# Patient Record
Sex: FEMALE | Race: WHITE | Employment: OTHER | URBAN - METROPOLITAN AREA
[De-identification: names, ages, dates, MRNs, and addresses within clinical notes are randomized per-mention and may not be internally consistent; named-entity substitution may affect disease eponyms.]

---

## 2017-07-19 ENCOUNTER — HOSPITAL ENCOUNTER (OUTPATIENT)
Dept: MAMMOGRAPHY | Age: 77
Discharge: HOME | End: 2017-07-19
Attending: FAMILY MEDICINE
Payer: MEDICARE

## 2017-07-19 DIAGNOSIS — Z12.31 VISIT FOR SCREENING MAMMOGRAM: ICD-10-CM

## 2017-07-19 PROCEDURE — 77067 SCR MAMMO BI INCL CAD: CPT

## 2020-08-09 ENCOUNTER — APPOINTMENT (OUTPATIENT)
Dept: CT IMAGING | Age: 80
End: 2020-08-09
Attending: EMERGENCY MEDICINE
Payer: MEDICARE

## 2020-08-09 ENCOUNTER — APPOINTMENT (OUTPATIENT)
Dept: GENERAL RADIOLOGY | Age: 80
End: 2020-08-09
Attending: EMERGENCY MEDICINE
Payer: MEDICARE

## 2020-08-09 ENCOUNTER — HOSPITAL ENCOUNTER (EMERGENCY)
Age: 80
Discharge: HOME | End: 2020-08-09
Attending: EMERGENCY MEDICINE
Payer: MEDICARE

## 2020-08-09 VITALS
SYSTOLIC BLOOD PRESSURE: 101 MMHG | HEART RATE: 68 BPM | BODY MASS INDEX: 26.31 KG/M2 | DIASTOLIC BLOOD PRESSURE: 60 MMHG | RESPIRATION RATE: 16 BRPM | OXYGEN SATURATION: 99 % | HEIGHT: 61 IN | TEMPERATURE: 98.7 F | WEIGHT: 139.33 LBS

## 2020-08-09 DIAGNOSIS — S00.83XA TRAUMATIC HEMATOMA OF FOREHEAD, INITIAL ENCOUNTER: ICD-10-CM

## 2020-08-09 DIAGNOSIS — S09.90XA MINOR HEAD INJURY, INITIAL ENCOUNTER: Primary | ICD-10-CM
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
## 2020-08-09 PROCEDURE — 72125 CT NECK SPINE W/O DYE: CPT

## 2020-08-09 PROCEDURE — 73552 X-RAY EXAM OF FEMUR 2/>: CPT

## 2020-08-09 PROCEDURE — 99283 EMERGENCY DEPT VISIT LOW MDM: CPT

## 2020-08-09 PROCEDURE — 70450 CT HEAD/BRAIN W/O DYE: CPT

## 2020-08-09 PROCEDURE — 73502 X-RAY EXAM HIP UNI 2-3 VIEWS: CPT

## 2020-08-09 NOTE — ED TRIAGE NOTES
Triage Note: Patient arrives to ER complaining of fall, patient states she slipped on wet juvencio and fell hitting her head. Bruising noted tot he left side of her forehead. Denies LOC. Takes a baby aspirin but not blood thinners. Also complaining of pain to the back of her upper left leg. Patient was ambulatory to treatment area.

## 2020-08-09 NOTE — DISCHARGE INSTRUCTIONS
Patient Education        Head Injury: Care Instructions  Your Care Instructions     Most injuries to the head are minor. Bumps, cuts, and scrapes on the head and face usually heal well and can be treated the same as injuries to other parts of the body. Although it's rare, once in a while a more serious problem shows up after you are home. So it's good to be on the lookout for symptoms for a day or two. Follow-up care is a key part of your treatment and safety. Be sure to make and go to all appointments, and call your doctor if you are having problems. It's also a good idea to know your test results and keep a list of the medicines you take. How can you care for yourself at home? · Follow your doctor's instructions. He or she will tell you if you need someone to watch you closely for the next 24 hours or longer. · Take it easy for the next few days or more if you are not feeling well. · Ask your doctor when it's okay for you to go back to activities like driving a car, riding a bike, or operating machinery. When should you call for help? [id number] anytime you think you may need emergency care. For example, call if:  · You have a seizure. · You passed out (lost consciousness). · You are confused or can't stay awake. Call your doctor now or seek immediate medical care if:  · You have new or worse vomiting. · You feel less alert. · You have new weakness or numbness in any part of your body. Watch closely for changes in your health, and be sure to contact your doctor if:  · You do not get better as expected. · You have new symptoms, such as headaches, trouble concentrating, or changes in mood. Where can you learn more? Go to http://aylin-amarilis.info/  Enter M264 in the search box to learn more about \"Head Injury: Care Instructions. \"  Current as of: November 20, 2019               Content Version: 12.5  © 7858-0916 Healthwise, Incorporated.    Care instructions adapted under license by [employer] (which disclaims liability or warranty for this information). If you have questions about a medical condition or this instruction, always ask your healthcare professional. [address] any warranty or liability for your use of this information.

## 2020-08-09 NOTE — ED PROVIDER NOTES
HPI       [de-identified] F here s/p fall. Occurred a few hours prior to arrival. Slipped on a wet floor and hit her L forehead. Landed on hardwood juvencio. No LOC. No vomiting. Overall feels ok. Takes ASA but no other blood thinners. Has also had some mild pain in the L upper hamstring since the fall but mostly only when flexing the leg. Is able to ambulate without discomfort. No distal weakness/numbness. No neck pain. No focal weakness/numbness. No chest pain. No trouble breathing. Past Medical History:   Diagnosis Date    Arthritis     GERD (gastroesophageal reflux disease)        Past Surgical History:   Procedure Laterality Date    HX BREAST BIOPSY Right     benign surgical biopsy    HX  SECTION      HX CHOLECYSTECTOMY      HX CYST INCISION AND DRAINAGE Left     HX DILATION AND CURETTAGE      HX ORTHOPAEDIC      left knee replacement    HX ORTHOPAEDIC      right rotator cuff repair    [place] COLONOSCOPY FLX DX W/COLLJ SPEC WHEN PFRMD  2012              No family history on file.     Social History     Socioeconomic History    Marital status:      Spouse name: Not on file    Number of children: Not on file    Years of education: Not on file    Highest education level: Not on file   Occupational History    Not on file   Social Needs    Financial resource strain: Not on file    Food insecurity     Worry: Not on file     Inability: Not on file    Transportation needs     Medical: Not on file     Non-medical: Not on file   Tobacco Use    Smoking status: Never Smoker   Substance and Sexual Activity    Alcohol use: No    Drug use: No    Sexual activity: Not on file   Lifestyle    Physical activity     Days per week: Not on file     Minutes per session: Not on file    Stress: Not on file   Relationships    Social connections     Talks on phone: Not on file     Gets together: Not on file     Attends Mormonism service: Not on file     Active member of club or organization: Not on file     Attends meetings of clubs or organizations: Not on file     Relationship status: Not on file    Intimate partner violence     Fear of current or ex partner: Not on file     Emotionally abused: Not on file     Physically abused: Not on file     Forced sexual activity: Not on file   Other Topics Concern    Not on file   Social History Narrative    Not on file         ALLERGIES: Beef containing products and Pork derived (porcine)    Review of Systems   Review of Systems   Constitutional: (-) weight loss. HEENT: (-) stiff neck   Eyes: (-) discharge. Respiratory: (-) cough. Cardiovascular: (-) syncope. Gastrointestinal: (-) blood in stool. Genitourinary: (-) hematuria. Musculoskeletal: (-) myalgias. Neurological: (-) seizure. Skin: (-) petechiae  Lymph/Immunologic: (-) enlarged lymph nodes  All other systems reviewed and are negative. There were no vitals filed for this visit. Physical Exam Nursing note and vitals reviewed. Constitutional: oriented to person, place, and time. appears well-developed and well-nourished. No distress. Head: Normocephalic and ecchymosis to the L forehead. No stepoffs/bogginess. Sclera anicteric  Nose: No rhinorrhea  EARS: no hemotympanum. Normal landmarks. Mouth/Throat: Oropharynx is clear and moist. Pharynx normal  Eyes: Conjunctivae are normal. Pupils are equal, round, and reactive to light. Right eye exhibits no discharge. Left eye exhibits no discharge. Neck: Painless normal range of motion. Neck supple. No LAD. Cardiovascular: Normal rate, regular rhythm, normal heart sounds and intact distal pulses. Exam reveals no gallop and no friction rub. No murmur heard. Pulmonary/Chest:  No respiratory distress. No wheezes. No rales. No rhonchi. No increased work of breathing. No accessory muscle use. Good air exchange throughout. Abdominal: soft, non-tender, no rebound or guarding. No hepatosplenomegaly. Normal bowel sounds throughout.   Back: no tenderness to palpation, no deformities, no CVA tenderness  Extremities/Musculoskeletal: Normal range of motion. no tenderness. No edema. Distal extremities are neurovasc intact. Lymphadenopathy:   No adenopathy. Neurological:  Alert and oriented to person, place, and time. Coordination normal. CN 2-12 intact. Motor and sensory function intact. Skin: Skin is warm and dry. No rash noted. No pallor. MDM  [de-identified] F here with a fall and hit her head. Sounds mechanical in nature. Non-focal exam. Will get CT head/c-spine and xray hip/femur.          Procedures

## 2021-02-15 ENCOUNTER — HOSPITAL ENCOUNTER (OUTPATIENT)
Age: 81
Setting detail: OBSERVATION
Discharge: HOME | End: 2021-02-16
Attending: EMERGENCY MEDICINE | Admitting: FAMILY MEDICINE
Payer: MEDICARE

## 2021-02-15 ENCOUNTER — APPOINTMENT (OUTPATIENT)
Dept: MRI IMAGING | Age: 81
End: 2021-02-15
Attending: FAMILY MEDICINE
Payer: MEDICARE

## 2021-02-15 ENCOUNTER — APPOINTMENT (OUTPATIENT)
Dept: CT IMAGING | Age: 81
End: 2021-02-15
Attending: EMERGENCY MEDICINE
Payer: MEDICARE

## 2021-02-15 ENCOUNTER — APPOINTMENT (OUTPATIENT)
Dept: MRI IMAGING | Age: 81
End: 2021-02-15
Attending: PSYCHIATRY & NEUROLOGY
Payer: MEDICARE

## 2021-02-15 DIAGNOSIS — R20.2 NUMBNESS AND TINGLING: ICD-10-CM

## 2021-02-15 DIAGNOSIS — R20.0 NUMBNESS AND TINGLING: ICD-10-CM

## 2021-02-15 DIAGNOSIS — R00.2 PALPITATIONS: Primary | ICD-10-CM

## 2021-02-15 LAB
ALBUMIN SERPL-MCNC: 3.5 G/DL (ref 3.5–5)
ALBUMIN/GLOB SERPL: 1 {RATIO} (ref 1.1–2.2)
ALP SERPL-CCNC: 51 U/L (ref 45–117)
ALT SERPL-CCNC: 27 U/L (ref 12–78)
ANION GAP SERPL CALC-SCNC: 10 MMOL/L (ref 5–15)
ASPIRIN TEST, ASPIRN: 400 ARU
AST SERPL-CCNC: 28 U/L (ref 15–37)
ATRIAL RATE: 67 BPM
BASOPHILS # BLD: 0.1 K/UL (ref 0–0.1)
BASOPHILS NFR BLD: 1 % (ref 0–1)
BILIRUB SERPL-MCNC: 0.5 MG/DL (ref 0.2–1)
BUN SERPL-MCNC: 14 MG/DL (ref 6–20)
BUN/CREAT SERPL: 18 (ref 12–20)
CALCIUM SERPL-MCNC: 8.6 MG/DL (ref 8.5–10.1)
CALCULATED P AXIS, ECG09: 48 DEGREES
CALCULATED R AXIS, ECG10: 25 DEGREES
CALCULATED T AXIS, ECG11: 33 DEGREES
CHLORIDE SERPL-SCNC: 106 MMOL/L (ref 97–108)
CO2 SERPL-SCNC: 28 MMOL/L (ref 21–32)
COMMENT, HOLDF: NORMAL
CREAT SERPL-MCNC: 0.79 MG/DL (ref 0.55–1.02)
DIAGNOSIS, 93000: NORMAL
DIFFERENTIAL METHOD BLD: ABNORMAL
EOSINOPHIL # BLD: 0.1 K/UL (ref 0–0.4)
EOSINOPHIL NFR BLD: 1 % (ref 0–7)
ERYTHROCYTE [DISTWIDTH] IN BLOOD BY AUTOMATED COUNT: 13.2 % (ref 11.5–14.5)
ERYTHROCYTE [SEDIMENTATION RATE] IN BLOOD: 16 MM/HR (ref 0–30)
GLOBULIN SER CALC-MCNC: 3.5 G/DL (ref 2–4)
GLUCOSE SERPL-MCNC: 109 MG/DL (ref 65–100)
HCT VFR BLD AUTO: 39.6 % (ref 35–47)
HGB BLD-MCNC: 12.7 G/DL (ref 11.5–16)
IMM GRANULOCYTES # BLD AUTO: 0 K/UL (ref 0–0.04)
IMM GRANULOCYTES NFR BLD AUTO: 0 % (ref 0–0.5)
LYMPHOCYTES # BLD: 2.2 K/UL (ref 0.8–3.5)
LYMPHOCYTES NFR BLD: 25 % (ref 12–49)
MCH RBC QN AUTO: 32.6 PG (ref 26–34)
MCHC RBC AUTO-ENTMCNC: 32.1 G/DL (ref 30–36.5)
MCV RBC AUTO: 101.5 FL (ref 80–99)
MONOCYTES # BLD: 0.7 K/UL (ref 0–1)
MONOCYTES NFR BLD: 8 % (ref 5–13)
NEUTS SEG # BLD: 5.8 K/UL (ref 1.8–8)
NEUTS SEG NFR BLD: 65 % (ref 32–75)
NRBC # BLD: 0 K/UL (ref 0–0.01)
NRBC BLD-RTO: 0 PER 100 WBC
P-R INTERVAL, ECG05: 176 MS
PLATELET # BLD AUTO: 358 K/UL (ref 150–400)
PMV BLD AUTO: 8.9 FL (ref 8.9–12.9)
POTASSIUM SERPL-SCNC: 4.3 MMOL/L (ref 3.5–5.1)
PROT SERPL-MCNC: 7 G/DL (ref 6.4–8.2)
Q-T INTERVAL, ECG07: 412 MS
QRS DURATION, ECG06: 68 MS
QTC CALCULATION (BEZET), ECG08: 435 MS
RBC # BLD AUTO: 3.9 M/UL (ref 3.8–5.2)
SAMPLES BEING HELD,HOLD: NORMAL
SODIUM SERPL-SCNC: 144 MMOL/L (ref 136–145)
TROPONIN I SERPL-MCNC: <0.05 NG/ML
TROPONIN I SERPL-MCNC: <0.05 NG/ML
VENTRICULAR RATE, ECG03: 67 BPM
WBC # BLD AUTO: 8.9 K/UL (ref 3.6–11)

## 2021-02-15 PROCEDURE — 84484 ASSAY OF TROPONIN QUANT: CPT

## 2021-02-15 PROCEDURE — 70450 CT HEAD/BRAIN W/O DYE: CPT

## 2021-02-15 PROCEDURE — 99285 EMERGENCY DEPT VISIT HI MDM: CPT

## 2021-02-15 PROCEDURE — 94762 N-INVAS EAR/PLS OXIMTRY CONT: CPT

## 2021-02-15 PROCEDURE — 74011250636 HC RX REV CODE- 250/636: Performed by: FAMILY MEDICINE

## 2021-02-15 PROCEDURE — 72141 MRI NECK SPINE W/O DYE: CPT

## 2021-02-15 PROCEDURE — 94760 N-INVAS EAR/PLS OXIMETRY 1: CPT

## 2021-02-15 PROCEDURE — 85025 COMPLETE CBC W/AUTO DIFF WBC: CPT

## 2021-02-15 PROCEDURE — 70551 MRI BRAIN STEM W/O DYE: CPT

## 2021-02-15 PROCEDURE — 70498 CT ANGIOGRAPHY NECK: CPT

## 2021-02-15 PROCEDURE — 74011250637 HC RX REV CODE- 250/637: Performed by: FAMILY MEDICINE

## 2021-02-15 PROCEDURE — 99205 OFFICE O/P NEW HI 60 MIN: CPT | Performed by: PSYCHIATRY & NEUROLOGY

## 2021-02-15 PROCEDURE — 93005 ELECTROCARDIOGRAM TRACING: CPT

## 2021-02-15 PROCEDURE — 74011000636 HC RX REV CODE- 636: Performed by: EMERGENCY MEDICINE

## 2021-02-15 PROCEDURE — 74011250636 HC RX REV CODE- 250/636: Performed by: EMERGENCY MEDICINE

## 2021-02-15 PROCEDURE — 36415 COLL VENOUS BLD VENIPUNCTURE: CPT

## 2021-02-15 PROCEDURE — 99222 1ST HOSP IP/OBS MODERATE 55: CPT | Performed by: RADIOLOGY

## 2021-02-15 PROCEDURE — 85652 RBC SED RATE AUTOMATED: CPT

## 2021-02-15 PROCEDURE — 85576 BLOOD PLATELET AGGREGATION: CPT

## 2021-02-15 PROCEDURE — 74011250637 HC RX REV CODE- 250/637: Performed by: PSYCHIATRY & NEUROLOGY

## 2021-02-15 PROCEDURE — 99218 HC RM OBSERVATION: CPT

## 2021-02-15 PROCEDURE — 80053 COMPREHEN METABOLIC PANEL: CPT

## 2021-02-15 RX ORDER — ACETAMINOPHEN 325 MG/1
650 TABLET ORAL
Status: DISCONTINUED | OUTPATIENT
Start: 2021-02-15 | End: 2021-02-16 | Stop reason: HOSPADM

## 2021-02-15 RX ORDER — ATORVASTATIN CALCIUM 40 MG/1
40 TABLET, FILM COATED ORAL
Status: DISCONTINUED | OUTPATIENT
Start: 2021-02-15 | End: 2021-02-16 | Stop reason: HOSPADM

## 2021-02-15 RX ORDER — ASPIRIN 81 MG/1
81 TABLET ORAL DAILY
Status: DISCONTINUED | OUTPATIENT
Start: 2021-02-16 | End: 2021-02-15

## 2021-02-15 RX ORDER — SODIUM CHLORIDE 9 MG/ML
75 INJECTION, SOLUTION INTRAVENOUS CONTINUOUS
Status: DISPENSED | OUTPATIENT
Start: 2021-02-15 | End: 2021-02-16

## 2021-02-15 RX ORDER — GUAIFENESIN 100 MG/5ML
81 LIQUID (ML) ORAL DAILY
Status: DISCONTINUED | OUTPATIENT
Start: 2021-02-15 | End: 2021-02-16 | Stop reason: HOSPADM

## 2021-02-15 RX ORDER — ACETAMINOPHEN 650 MG/1
650 SUPPOSITORY RECTAL
Status: DISCONTINUED | OUTPATIENT
Start: 2021-02-15 | End: 2021-02-16 | Stop reason: HOSPADM

## 2021-02-15 RX ORDER — ONDANSETRON 2 MG/ML
4 INJECTION INTRAMUSCULAR; INTRAVENOUS
Status: DISCONTINUED | OUTPATIENT
Start: 2021-02-15 | End: 2021-02-16 | Stop reason: HOSPADM

## 2021-02-15 RX ORDER — THERA TABS 400 MCG
1 TAB ORAL DAILY
Status: DISCONTINUED | OUTPATIENT
Start: 2021-02-16 | End: 2021-02-16 | Stop reason: HOSPADM

## 2021-02-15 RX ORDER — SODIUM CHLORIDE 0.9 % (FLUSH) 0.9 %
10 SYRINGE (ML) INJECTION
Status: COMPLETED | OUTPATIENT
Start: 2021-02-15 | End: 2021-02-15

## 2021-02-15 RX ORDER — FAMOTIDINE 20 MG/1
20 TABLET, FILM COATED ORAL EVERY 12 HOURS
Status: DISCONTINUED | OUTPATIENT
Start: 2021-02-15 | End: 2021-02-16 | Stop reason: HOSPADM

## 2021-02-15 RX ADMIN — FAMOTIDINE 20 MG: 20 TABLET, FILM COATED ORAL at 20:53

## 2021-02-15 RX ADMIN — FAMOTIDINE 20 MG: 20 TABLET, FILM COATED ORAL at 12:13

## 2021-02-15 RX ADMIN — SODIUM CHLORIDE 75 ML/HR: 9 INJECTION, SOLUTION INTRAVENOUS at 12:13

## 2021-02-15 RX ADMIN — SODIUM CHLORIDE 500 ML: 9 INJECTION, SOLUTION INTRAVENOUS at 07:37

## 2021-02-15 RX ADMIN — Medication 10 ML: at 06:53

## 2021-02-15 RX ADMIN — ASPIRIN 81 MG: 81 TABLET, CHEWABLE ORAL at 14:59

## 2021-02-15 RX ADMIN — ATORVASTATIN CALCIUM 40 MG: 40 TABLET, FILM COATED ORAL at 21:01

## 2021-02-15 RX ADMIN — IOPAMIDOL 100 ML: 755 INJECTION, SOLUTION INTRAVENOUS at 06:53

## 2021-02-15 NOTE — PROGRESS NOTES
Physical Therapy 2/15/2021    Orders received and chart reviewed up to date. Head CT showed no acute findings, awaiting MRI. Pt with no weakness or numbness. Per RN, pt ambulated to bathroom without difficulty. Pt able to  items from floor. Pt lives next door to son, no stairs. Pt reports no concern for home. Educated pt on BEFAST. Will complete orders as no acute PT needs. Recommend with nursing patient to complete as able in order to maintain strength, endurance and independence: OOB to chair 3x/day and ambulating in hallway. Thank you.   Kevin Zuniga, PT, DPT

## 2021-02-15 NOTE — PROGRESS NOTES
Speech pathology note  Reviewed chart and note patient admitted with tingling with concern for CVA. Note CT showed no acute findings and MRI pending. Note patient on a  regular diet. NIHSS=0. Introduced self and role of SLP. Patient oriented x4 and denied decline in speech, language, swallow, and cognition. Patient swallow informally assessed when patient took a sip of water from straw and showed no overt s/s of aspiration. Formal SLP evaluation not clinically indicated at this time. Will sign off. Please re-consult if further needs arise. Thank you.     Kita Spears  SLP Student

## 2021-02-15 NOTE — ACP (ADVANCE CARE PLANNING)
[id number] [facility]  [facility]ospitalist Group                                      Advance Care Planning Note    Name: Sanjuanita Waldron  YOB: 1940  MRN: [id number]  Admission Date: 2/15/2021  6:04 AM    Date of discussion: 2/15/2021    Active Diagnoses:    Hospital Problems  Date Reviewed: 9/9/2016          Codes Class Noted POA    Palpitations ICD-10-CM: R00.2  ICD-9-CM: 785.1  7/8/2015 Unknown              These active diagnoses are of sufficient risk that focused discussion on advance care planning is indicated in order to allow the patient to thoughtfully consider personal goals of care, and if situations arise that prevent the ability to personally give input, to ensure appropriate representation of their personal desires for different levels and aggressiveness of care. Discussion:     Persons present and participating in discussion: Jolie Helms MD, patient's daughter    Topics Discussed:  Patient's medical condition and diagnosis: [ x ] yes [  ] no   Surrogate decision maker: [ x ] yes [  ] no   Patient's current physical function/cognitive function/frailty: [x  ] yes [  ] no   Code Status: [ x ] yes [  ] no   Artificial Nutrition / Dialysis / Non-Invasive Ventilation / Blood Transfusion: [x  ] yes [  ] no  Potential Resources for home (durable medical equipment, home nursing, home O2): [  ] yes [  ] no    Overview of Discussion:     I had a detailed discussion with the patient and her daughter who is present at the bedside regarding code status. I explained in detail the need for having this discussion and what this discussion entails. I explained what code status means and how it may impact the medical care plan. I explained in detail what Full Code, Partial code and DNR/DNI mean. I explained in detail what modalities are employed with each of these code statuses.  I explained in detail what CPR, Mechanical and non invasive ventilation, shocks, pressors, intubation and other modalities used during Resuscitative processes entail. All of the questions and concerns were answered. The patient states that she wishes to be full code    Time Spent:     Total time spent face-to-face in education and discussion: 21  minutes.      Nnamdi Schmitz MD  Date of Service:  2/15/2021  11:45 AM

## 2021-02-15 NOTE — ED TRIAGE NOTES
Patient arrives by EMS with c/o palpitations about 2 hours ago, lasting 10 seconds and resolved itself. Patient also states she was having tingling to both arms; left arm completely resolved, but still has some tingling on right arm. 12 normal in route.

## 2021-02-15 NOTE — ROUTINE PROCESS
TRANSFER - OUT REPORT: 
 
Verbal report given to [facility] [place] RN(name) on Keila Chawla  being transferred to [facility](unit) for routine progression of care Report consisted of patients Situation, Background, Assessment and  
Recommendations(SBAR). Information from the following report(s) SBAR and ED Summary was reviewed with the receiving nurse. Lines:  
Peripheral IV 02/15/21 Left Forearm (Active) Site Assessment Clean, dry, & intact 02/15/21 0600 Phlebitis Assessment 0 02/15/21 0600 Infiltration Assessment 0 02/15/21 0600 Dressing Status Clean, dry, & intact 02/15/21 0600 Hub Color/Line Status Pink 02/15/21 0600 Opportunity for questions and clarification was provided.

## 2021-02-15 NOTE — CONSULTS
80 with transient bilateral upper extremity numbness associated with heart palpitations. CTA shows bilateral cervical ICA vasculopathy, maybe FMD.  No dissection or significant stenosis. Cervical congenital spinal stenosis noted. MRI pending. Sed rate to rule out large vessel vasculitis. Appreciate consult. We will follow up when studies completed. [facility] NIS  [phone]      Past Medical History:   Diagnosis Date    Arthritis     GERD (gastroesophageal reflux disease)      Past Surgical History:   Procedure Laterality Date    HX BREAST BIOPSY Right     benign surgical biopsy    HX  SECTION      HX CHOLECYSTECTOMY      HX CYST INCISION AND DRAINAGE Left     HX DILATION AND CURETTAGE      HX ORTHOPAEDIC      left knee replacement    HX ORTHOPAEDIC      right rotator cuff repair    DC COLONOSCOPY FLX DX W/COLLJ SPEC WHEN PFRMD  2012           History reviewed. No pertinent family history.   Social History     Tobacco Use    Smoking status: Never Smoker    Smokeless tobacco: Never Used   Substance Use Topics    Alcohol use: No      Current Facility-Administered Medications   Medication Dose Route Frequency Provider Last Rate Last Admin    sodium chloride 0.9 % bolus infusion 100 mL  100 mL IntraVENous RAD MD Luiz Ledezma ON 2021] therapeutic multivitamin (THERAGRAN) tablet 1 Tab  1 Tab Oral DAILY Vic Lyles MD        atorvastatin (LIPITOR) tablet 40 mg  40 mg Oral QHS Vic Lyles MD        acetaminophen (TYLENOL) tablet 650 mg  650 mg Oral Q4H PRN Vic Llyes MD        Or   Luiz Cardona acetaminophen (TYLENOL) solution 650 mg  650 mg Per NG tube Q4H PRN Vic Lyles MD        Or   Luiz Cardona acetaminophen (TYLENOL) suppository 650 mg  650 mg Rectal Q4H PRN Vic Lyles MD        0.9% sodium chloride infusion  75 mL/hr IntraVENous CONTINUOUS Vic Lyles MD 75 mL/hr at 02/15/21 1213 75 mL/hr at 02/15/21 1213    ondansetron (ZOFRAN) injection 4 mg  4 mg IntraVENous Q6H PRN Tylliliana Beltrán MD        famotidine (PEPCID) tablet 20 mg  20 mg Oral Q12H Tylene MD Jerel   20 mg at 02/15/21 1213    aspirin chewable tablet 81 mg  81 mg Oral DAILY Donta Valderrama DO   81 mg at 02/15/21 1459        Allergies   Allergen Reactions    Beef Containing Products Hives    Pork Derived (Porcine) Hives       Review of Systems:  A comprehensive review of systems was negative except for that written in the History of Present Illness. Objective:     Vitals:    02/15/21 0730 02/15/21 0815 02/15/21 1010 02/15/21 1443   BP: (!) 141/82 (!) 145/67 135/77 (!) 129/55   Pulse: 69 69 72 72   Resp: 15 17 18 18   Temp:  98.2 °F (36.8 °C) 97.7 °F (36.5 °C) 98 °F (36.7 °C)   SpO2: 98% 100% 99% 96%   Weight:       Height:   5' 1\" (1.549 m)         Physical Exam:  GENERAL: alert, cooperative, no distress, appears stated age    NIHSS:      1a-LOC: 0    1b-Month/Age:0    1c-Open/Close Hand:0    2-Best Gaze:0    3-Visual Fields:0    4-Facial Palsy:0    5a-Left Arm:0    5b-Right Arm:0    6a-Left Le    6b-Right Le    7-Limb Ataxia:0    8-Sensory:0    9-Best Language:0    10-Dysarthria:0    11-Extinction/Inattention:0  TOTAL SCORE:0    Neurologic Exam:  Mental Status:  Alert and oriented x 4. Appropriate affect, mood and behavior. Language:    Normal fluency, repetition, comprehension and naming. Cranial Nerves:   Normal appearing fundi, sharp discs. Pupils equal, round and reactive to light. Visual fields full to confrontation. Extraocular movements intact. End gaze nystagmus bilaterally, L>R. Facial sensation intact V1 - V3. Full facial strength, no asymmetry. Hearing intact bilaterally. No dysarthria. Tongue protrudes to midline, palate elevates symmetrically. Shoulder shrug 5/5 bilaterally. Motor:    No pronator drift.       Bulk and tone normal.      5/5 power in all extremities proximally and distally. No involuntary movements. Sensation:    Sensation intact throughout to light touch, temperature,     pinprick, vibration, proprioception; decreased pinprick in length    dependent fashion    Coordination & Gait: Gait deferred, no ataxia. .        Thank you for this consult and participating in the care of this patient. I have discussed the diagnosis with the patient and the intended plan as seen in the above orders. Patient is in agreement.       Signed By: Lady Cox MD     February 15, 2021

## 2021-02-15 NOTE — ED PROVIDER NOTES
49-year-old female with past medical history significant for high cholesterol presents with complaints of episode of palpitations this morning lasting approximately 10 seconds associated with numbness and tingling in her bilateral upper extremities. Patient reports numbness and tingling is greater in the right upper extremity versus the left. Left upper extremity numbness and tingling resolved prior to arrival.  Denies any recent illness. Denies chest pain, shortness of breath, nausea, vomiting, diarrhea, constipation, urinary complaints. Denies any history of similar problems. No other complaints. Denies tobacco use, drug use, alcohol use  Primary CARE physician[facility]           Past Medical History:   Diagnosis Date    Arthritis     GERD (gastroesophageal reflux disease)        Past Surgical History:   Procedure Laterality Date    HX BREAST BIOPSY Right     benign surgical biopsy    HX  SECTION      HX CHOLECYSTECTOMY      HX CYST INCISION AND DRAINAGE Left     HX DILATION AND CURETTAGE      HX ORTHOPAEDIC      left knee replacement    HX ORTHOPAEDIC      right rotator cuff repair    AL COLONOSCOPY FLX DX W/COLLJ SPEC WHEN PFRMD  2012              History reviewed. No pertinent family history.     Social History     Socioeconomic History    Marital status:      Spouse name: Not on file    Number of children: Not on file    Years of education: Not on file    Highest education level: Not on file   Occupational History    Not on file   Social Needs    Financial resource strain: Not on file    Food insecurity     Worry: Not on file     Inability: Not on file    Transportation needs     Medical: Not on file     Non-medical: Not on file   Tobacco Use    Smoking status: Never Smoker    Smokeless tobacco: Never Used   Substance and Sexual Activity    Alcohol use: No    Drug use: No    Sexual activity: Not on file   Lifestyle    Physical activity     Days per week: Not on file     Minutes per session: Not on file    Stress: Not on file   Relationships    Social connections     Talks on phone: Not on file     Gets together: Not on file     Attends Shinto service: Not on file     Active member of club or organization: Not on file     Attends meetings of clubs or organizations: Not on file     Relationship status: Not on file    Intimate partner violence     Fear of current or ex partner: Not on file     Emotionally abused: Not on file     Physically abused: Not on file     Forced sexual activity: Not on file   Other Topics Concern    Not on file   Social History Narrative    Not on file         ALLERGIES: Beef containing products and Pork derived (porcine)    Review of Systems   Constitutional: Negative for chills and fever. HENT: Negative for congestion, nosebleeds and rhinorrhea. Eyes: Negative for pain and redness. Respiratory: Negative for cough and shortness of breath. Cardiovascular: Positive for palpitations. Negative for chest pain. Gastrointestinal: Negative for abdominal pain, nausea and vomiting. Genitourinary: Negative for dysuria, frequency, vaginal bleeding and vaginal pain. Musculoskeletal: Negative for myalgias. Skin: Negative for rash and wound. Neurological: Positive for numbness. Negative for seizures, syncope and weakness. Hematological: Does not bruise/bleed easily. Psychiatric/Behavioral: Negative for agitation, confusion, dysphoric mood and suicidal ideas. The patient is not nervous/anxious. Vitals:    02/15/21 0602   BP: (!) 152/65   Pulse: 66   Resp: 20   Temp: 98.3 °F (36.8 °C)   SpO2: 98%   Weight: 70.6 kg (155 lb 10.3 oz)            Physical Exam  Vitals signs and nursing note reviewed. Constitutional:       Appearance: She is well-developed. HENT:      Head: Normocephalic and atraumatic. Eyes:      Pupils: Pupils are equal, round, and reactive to light.    Neck:      Musculoskeletal: Normal range of motion and neck supple. Trachea: No tracheal deviation. Cardiovascular:      Rate and Rhythm: Normal rate and regular rhythm. Heart sounds: Normal heart sounds. Pulmonary:      Effort: Pulmonary effort is normal. No respiratory distress. Breath sounds: Normal breath sounds. No stridor. No wheezing or rales. Chest:      Chest wall: No tenderness. Abdominal:      General: Bowel sounds are normal. There is no distension. Palpations: Abdomen is soft. Tenderness: There is no abdominal tenderness. There is no rebound. Musculoskeletal: Normal range of motion. General: No tenderness. Skin:     General: Skin is warm and dry. Coloration: Skin is not pale. Findings: No rash. Neurological:      Mental Status: She is alert and oriented to person, place, and time. Cranial Nerves: No cranial nerve deficit. Comments: Initial NIH stroke score 0          MDM  Number of Diagnoses or Management Options  Numbness and tingling  Palpitations  Diagnosis management comments: 80-year-old female with history of high cholesterol presents with complaints of episode of palpitations associated with numbness and tingling in bilateral upper extremities. Patient is well-appearing, no acute distress, hemodynamically stable, afebrile, nontoxic, no respiratory distress, stroke score 0.plan-stroke protocol       Amount and/or Complexity of Data Reviewed  Clinical lab tests: ordered and reviewed  Tests in the radiology section of CPT®: ordered and reviewed  Discuss the patient with other providers: yes  Independent visualization of images, tracings, or specimens: yes    Patient Progress  Patient progress: stable         Procedures      6:29 AM  Truman Hicks MD spoke with Dr. Hermelinda Lowe, Consult for Neurology. Discussed available diagnostic tests and clinical findings. He/She is in agreement with care plans as outlined. He/she will see and evaluate patient.      ED EKG interpretation:  Rhythm: normal sinus rhythm; and regular . Rate (approx.): 67; Axis: normal; P wave: normal; QRS interval: normal ; ST/T wave: normal; Other findings: normal. This EKG was interpreted by Sherrie Bell MD,ED Provider. 7:23 AM  Dr. Johny Hernandez reports continuing ASA. Not consistent with stroke. No TPA. Recommends admit for continued possible TIA evaluation. Pt reports continued RUE numbness/tingling. Pt agreeable with admission. Perfect Serve Consult for Admission  7:29 AM    ED Room Number: SER07/07  Patient Name and age: Colton Call [de-identified] y.o.  female  Working Diagnosis:   1. Palpitations    2.  Numbness and tingling        COVID-19 Suspicion:  no  Sepsis present:  no  Reassessment needed: no  Code Status:  Full Code  Readmission: no  Isolation Requirements:  no  Recommended Level of Care:  telemetry  Department:[facility]mp [phone]

## 2021-02-15 NOTE — ED NOTES
Addendum:  CTA returned. Dr. Nickie Mckeon has reevaluated the patient, symptoms resolving. She does not think the patient needs neuro intervention as symptoms are resolving. Not a TPA candidate. Discussed speaking with neuro intervention, she advised no need as there is no reason for intervention as the patient has no current symptoms. Wait for MRI.

## 2021-02-15 NOTE — CONSULTS
INPATIENT NEUROLOGY CONSULTATION  2/15/2021     Consulted by: Elroy May MD        Patient ID:  Brett Almeida  [id number]  [de-identified] y.o.  1940    CC: Arm numbness tingling    HPI    Mrs. Addison Graham is an [de-identified] woman with a history of arthritis here because she woke up in the middle night with sudden intense paresthesias of both upper extremities. Difficult to say one side was worse but symptoms might have been more intense on the right. She had no weakness. She also had some palpitations following. She has had similar before but not as intense which worried her and prompted her to be evaluated at the hospital.  She has no neck pain. No problems walking. No falls. She takes a daily baby aspirin. Work-up thus far with a CTA showing bilateral fibromuscular dysplasia but no acute process and evidence of incidental C-spine stenosis. She feels almost back to her baseline now. Review of Systems   Cardiovascular: Positive for palpitations. Neurological: Positive for tingling and sensory change. Negative for focal weakness. All other systems reviewed and are negative. Past Medical History:   Diagnosis Date    Arthritis     GERD (gastroesophageal reflux disease)      History reviewed. No pertinent family history.   Social History     Socioeconomic History    Marital status:      Spouse name: Not on file    Number of children: Not on file    Years of education: Not on file    Highest education level: Not on file   Occupational History    Not on file   Social Needs    Financial resource strain: Not on file    Food insecurity     Worry: Not on file     Inability: Not on file    Transportation needs     Medical: Not on file     Non-medical: Not on file   Tobacco Use    Smoking status: Never Smoker    Smokeless tobacco: Never Used   Substance and Sexual Activity    Alcohol use: No    Drug use: No    Sexual activity: Not on file   Lifestyle    Physical activity     Days per week: Not on file     Minutes per session: Not on file    Stress: Not on file   Relationships    Social connections     Talks on phone: Not on file     Gets together: Not on file     Attends Shinto service: Not on file     Active member of club or organization: Not on file     Attends meetings of clubs or organizations: Not on file     Relationship status: Not on file    Intimate partner violence     Fear of current or ex partner: Not on file     Emotionally abused: Not on file     Physically abused: Not on file     Forced sexual activity: Not on file   Other Topics Concern    Not on file   Social History Narrative    Not on file     Current Facility-Administered Medications   Medication Dose Route Frequency    sodium chloride 0.9 % bolus infusion 100 mL  100 mL IntraVENous RAD ONCE    [START ON 2/16/2021] therapeutic multivitamin (THERAGRAN) tablet 1 Tab  1 Tab Oral DAILY    atorvastatin (LIPITOR) tablet 40 mg  40 mg Oral QHS    acetaminophen (TYLENOL) tablet 650 mg  650 mg Oral Q4H PRN    Or    acetaminophen (TYLENOL) solution 650 mg  650 mg Per NG tube Q4H PRN    Or    acetaminophen (TYLENOL) suppository 650 mg  650 mg Rectal Q4H PRN    0.9% sodium chloride infusion  75 mL/hr IntraVENous CONTINUOUS    ondansetron (ZOFRAN) injection 4 mg  4 mg IntraVENous Q6H PRN    famotidine (PEPCID) tablet 20 mg  20 mg Oral Q12H    aspirin chewable tablet 81 mg  81 mg Oral DAILY     Allergies   Allergen Reactions    Beef Containing Products Hives    Pork Derived (Porcine) Hives       Visit Vitals  /77 (BP 1 Location: Left upper arm, BP Patient Position: At rest)   Pulse 72   Temp 97.7 °F (36.5 °C)   Resp 18   Ht 5' 1\" (1.549 m)   Wt 155 lb 10.3 oz (70.6 kg)   SpO2 99%   Breastfeeding No   BMI 29.41 kg/m²     Physical Exam   Constitutional: She appears well-developed and well-nourished. Cardiovascular: Normal rate. Pulmonary/Chest: Effort normal.   Skin: Skin is warm and dry.    Psychiatric: Her behavior is normal.   Vitals reviewed. Neurologic Exam     Mental Status   Pleasant elderly woman awake and alert following commands. Pupils are equal, EOMI  Face is grossly symmetric on smile tongue is midline speech is clear without aphasia  Strength is 5/5 throughout with good effort  Sensation grossly intact  Right arm and leg hyperreflexic 2/4 left side 1/4  No abnormal movements  Gait deferred            Lab Results   Component Value Date/Time    WBC 8.9 02/15/2021 06:15 AM    HGB 12.7 02/15/2021 06:15 AM    HCT 39.6 02/15/2021 06:15 AM    PLATELET 475 05/06/6703 06:15 AM    .5 (H) 02/15/2021 06:15 AM     Lab Results   Component Value Date/Time    ALT (SGPT) 27 02/15/2021 06:15 AM    Alk. phosphatase 51 02/15/2021 06:15 AM    Bilirubin, total 0.5 02/15/2021 06:15 AM    Albumin 3.5 02/15/2021 06:15 AM    Protein, total 7.0 02/15/2021 06:15 AM    PLATELET 792 60/26/9976 06:15 AM        CT Results (maximum last 3): Results from [place] encounter on 02/15/21   CTA CODE NEURO HEAD AND NECK W CONT    Narrative *PRELIMINARY REPORT*    CT examination of the head and neck demonstrates no large vessel occlusion. Preliminary report was provided by Dr. Marcelina Milton, the on-call radiologist, at  7:15 AM    Final report to follow. *END PRELIMINARY REPORT*    CLINICAL HISTORY: Code stroke. Palpitations. Tingling in the upper extremities. EXAMINATION:  CT ANGIOGRAPHY HEAD AND NECK    COMPARISON: Head CT September 2012    TECHNIQUE:  Following the uneventful administration of iodinated contrast  material, axial CT angiography of the head and neck was performed. Delayed axial  images through the head were also obtained. Coronal and sagittal reconstructions  were obtained. Manual postprocessing of images was performed. 3-D  Sagittal  maximal intensity projection images were obtained. 3-D Coronal maximal  intensity projections were obtained.  CT dose reduction was achieved through use  of a standardized protocol tailored for this examination and automatic exposure  control for dose modulation. FINDINGS:    Delayed contrast-enhanced head CT:    Prominence of the ventricles and sulci indicative of age-related involutional  changes. There is no acute intra or extra-axial hemorrhage. The basal cisterns  are clear. The paranasal sinuses are clear. CTA NECK:    Great vessels: Normal arch anatomy with the origins patent. Right subclavian artery: Patent    Left subclavian artery: Patent     Right common carotid artery: Patent     Left common carotid artery: Patent     Cervical right internal carotid artery: Patent with no significant stenosis by  NASCET criteria. Cervical left internal carotid artery: Patent with no significant stenosis by  NASCET criteria. Right vertebral artery: Patent    Left vertebral artery: Patent    The lung apices are clear. The thyroid is homogeneous. No cervical  lymphadenopathy. CTA HEAD:    Right cavernous internal carotid artery: Patent    Left cavernous internal carotid artery: Patent    Anterior cerebral arteries: Patent    Anterior communicating artery: Patent    Right middle cerebral artery: Patent    Left middle cerebral artery: Patent    Posterior communicating arteries: Diminutive    Posterior cerebral arteries: Patent    Basilar artery: Patent    Distal vertebral arteries: Patent    Beaded appearance of the distal vertebral arteries and distal cervical internal  carotid arteries suggestive of fibromuscular dysplasia. Impression 1. No evidence of large vessel occlusion or hemodynamically significant carotid  stenosis. 2.  Beaded appearance of the distal cervical internal carotid arteries and  distal vertebral arteries indicative of fibromuscular dysplasia. CT CODE NEURO HEAD WO CONTRAST    Narrative History: Bilateral numbness and tingling, focal on the right.     Unenhanced axial images were performed from the skull base to the high  convexities. Ventricles and sulci are enlarged. There is no intra or extra-axial fluid  collection. There is no mass, mass effect, or shift. The bony calvarium and  sinuses appear unremarkable. Impression No acute findings. MRI Results (maximum last 3): Results from [place] encounter on 10/10/11   MRI SHOULDER RIGHT WITHOUT CONTRAST    Narrative **Final Report**       ICD Codes / Adm. Diagnosis: 727.61   / COMPLETE RUPTURE OF ROTATOR CU    Examination:  MRI SHOULDER WO CON RT  - [id number] - Oct 10 2011  2:08PM  Accession No:  [id number]  Reason:  727.61      REPORT:  INDICATION: Right shoulder pain for one month. COMPARISON: None    TECHNIQUE: Axial, oblique coronal, and oblique sagittal noncontrast  MRI of   the right shoulder in the T1, T2, and proton density pulse sequences with   and without fat saturation performed on the open 0.7 Abbi magnet. FINDINGS: The bone marrow signal is within normal limits without evidence of   fracture or dislocation. A small glenohumeral joint effusion extends into   the subacromial/subdeltoid bursa. Ill-defined full thickness tear of the distal, anterior supraspinatus   tendon. Indistinct partial thickness tear of the distal subscapularis   tendon. Infraspinatus and teres minor tendons are intact. The biceps tendon   and anchor are intact. The glenoid labrum is grossly intact within the limitations of this   nonarthrographic study. The inferior glenohumeral ligament is within normal   limits. Moderate AC joint osteoarthritis. The anterior acromion process is type 2. The muscles are age-appropriate without focal atrophy or inflammation. IMPRESSION:   1. Indistinct full thickness tear of the supraspinatus tendon. 2. Indistinct partial thickness tear of the subscapularis tendon. 3. AC joint osteoarthritis.              Signing/Reading Doctor: Daljit Yanez ([id number])    Approved: Daljit Yanez ([id number])  10/10/2011 VAS/US/Carotid Doppler Results (maximum last 3): No results found for this or any previous visit. PET Results (maximum last 3): No results found for this or any previous visit. Assessment and Plan        [de-identified]year-old woman who was woken up by intense paresthesias of the bilateral upper extremities. I suspect this is most likely C-spine in origination. She has hyperreflexia on the right compared to the left. Fortunately no weakness. CTA showing evidence of fibromuscular dysplasia which is a stroke risk. Agree with MRI brain. If anything we will have baseline imaging. Maintain only her outpatient baby aspirin for now chewable 81 mg and I have resume that for today. Check ARU tonight. I discussed with her my assessment particular regarding the C-spine and the blood vessel abnormalities identified. Questions answered. I also spoke with her daughter at the bedside. We will follow-up once imaging is done. During this evaluation, we also discussed stroke education to include signs and symptoms of stroke and TIA. This clinical note was dictated with an electronic dictation software that can make unintentional errors. If there are any questions, please contact me directly for clarification.       [address] [facility],   NEUROLOGIST  Diplomate KIM  2/15/2021

## 2021-02-15 NOTE — H&P
History & Physical    Primary Care Provider: Primitivo Parr MD  Source of Information: Patient     History of Presenting Illness:   Florencio Cornelius is a [de-identified] y.o. female who presents with numbness in bilateral upper extremity. Patient reports the symptoms started around 3:00 AM this morning. .  Patient reports that she also had an episode of palpitation that lasted for about 10 seconds earlier this morning. Patient reports that the numbness was greater in the right upper extremity as compared to the left but she had it in both extremities. Patient reports that currently the numbness has resolved. Patient reports that she got concerned and decided to come to the hospital.  Patient reports that she has not had palpitations like this in the past although has had some palpitations which were minimally noticeable in the past.  Patient came [facility] the ER [facility] and was requested to be admitted to the hospitalist service. Patient currently denies any other complaints or problems. The patient denies any Headache, blurry vision, sore throat, trouble swallowing, trouble with speech, chest pain, SOB, cough, fever, chills, N/V/D, abd pain, urinary symptoms, constipation, recent travels, sick contacts,  falls, injuries, rashes, contact with COVID-19 diagnosed patients, hematemesis, melena, hemoptysis, hematuria, rashes, denies starting any new medications and denies any other concerns or problems besides as mentioned above. Review of Systems:  A comprehensive review of systems was negative except for that written in the History of Present Illness.      Past Medical History:   Diagnosis Date    Arthritis     GERD (gastroesophageal reflux disease)       Past Surgical History:   Procedure Laterality Date    HX BREAST BIOPSY Right     benign surgical biopsy    HX  SECTION      HX CHOLECYSTECTOMY      HX CYST INCISION AND DRAINAGE Left     HX DILATION AND CURETTAGE      HX ORTHOPAEDIC      left knee replacement    HX ORTHOPAEDIC      right rotator cuff repair    [place] COLONOSCOPY FLX DX W/COLLJ SPEC WHEN PFRMD  7/9/2012          Prior to Admission medications    Medication Sig Start Date End Date Taking? Authorizing Provider   Omega-3 Fatty Acids (FISH OIL) 300 mg cap Take 360 mg by mouth. Taking one cap in the am and two in the evening   Yes Provider, Historical   OTHER One garlic tab daily   Yes Provider, Historical   aspirin delayed-release 81 mg tablet Take  by mouth daily. Yes Provider, Historical   multivitamin (ONE A DAY) tablet Take 1 Tab by mouth daily. Yes Provider, Historical   simvastatin (ZOCOR) 40 mg tablet Take  by mouth nightly. Yes Provider, Historical   ACETAMINOPHEN/DIPHENHYDRAMINE (TYLENOL PM PO) Take 1 Tab by mouth nightly as needed. Provider, Historical     Allergies   Allergen Reactions    Beef Containing Products Hives    Pork Derived (Porcine) Hives      History reviewed. No pertinent family history.      SOCIAL HISTORY:  Patient resides:  Independently x   Assisted Living    SNF    With family care       Smoking history:   None x   Former    Chronic      Alcohol history:   None x   Social    Chronic      Ambulates:   Independently x   w/cane    w/walker    w/wc    CODE STATUS:  DNR    Full x   Other      Objective:     Physical Exam:     Visit Vitals  /77 (BP 1 Location: Left upper arm, BP Patient Position: At rest)   Pulse 72   Temp 97.7 °F (36.5 °C)   Resp 18   Ht 5' 1\" (1.549 m)   Wt 70.6 kg (155 lb 10.3 oz)   SpO2 99%   Breastfeeding No   BMI 29.41 kg/m²      O2 Device: Room air    General : alert x 3, awake, no acute distress, resting in bed, pleasant female, appears to be stated age  HEENT: PEERL, EOMI, moist mucus membrane, TM clear  Neck: supple, no JVD, no meningeal signs  Chest: Clear to auscultation bilaterally   CVS: S1 S2 heard, Capillary refill less than 2 seconds  Abd: soft/ Non tender, non distended, BS physiological,   Ext: no clubbing, no cyanosis, no edema, brisk 2+ DP pulses  Neuro/Psych: pleasant mood and affect, CN 2-12 grossly intact, sensory grossly within normal limit, Strength 5/5 in all extremities, DTR 1+ x 4  Skin: warm    EKG: Normal sinus rhythm with nonspecific ST changes      Data Review:     Recent Days:  Recent Labs     02/15/21  0615   WBC 8.9   HGB 12.7   HCT 39.6        Recent Labs     02/15/21  0615      K 4.3      CO2 28   *   BUN 14   CREA 0.79   CA 8.6   ALB 3.5   ALT 27     No results for input(s): PH, PCO2, PO2, HCO3, FIO2 in the last 72 hours. 24 Hour Results:  Recent Results (from the past 24 hour(s))   EKG, 12 LEAD, INITIAL    Collection Time: 02/15/21  6:02 AM   Result Value Ref Range    Ventricular Rate 67 BPM    Atrial Rate 67 BPM    P-R Interval 176 ms    QRS Duration 68 ms    Q-T Interval 412 ms    QTC Calculation (Bezet) 435 ms    Calculated P Axis 48 degrees    Calculated R Axis 25 degrees    Calculated T Axis 33 degrees    Diagnosis       Normal sinus rhythm  Normal ECG  When compared with ECG of 07-SEP-2012 19:30,  T wave inversion no longer evident in Inferior leads  Confirmed by Mejia Soto MD ([id number]) on 2/15/2021 10:29:13 AM     SAMPLES BEING HELD    Collection Time: 02/15/21  6:07 AM   Result Value Ref Range    SAMPLES BEING HELD RED,GREN,LAV     COMMENT        Add-on orders for these samples will be processed based on acceptable specimen integrity and analyte stability, which may vary by analyte.    CBC WITH AUTOMATED DIFF    Collection Time: 02/15/21  6:15 AM   Result Value Ref Range    WBC 8.9 3.6 - 11.0 K/uL    RBC 3.90 3.80 - 5.20 M/uL    HGB 12.7 11.5 - 16.0 g/dL    HCT 39.6 35.0 - 47.0 %    .5 (H) 80.0 - 99.0 FL    MCH 32.6 26.0 - 34.0 PG    MCHC 32.1 30.0 - 36.5 g/dL    RDW 13.2 11.5 - 14.5 %    PLATELET 716 844 - 486 K/uL    MPV 8.9 8.9 - 12.9 FL    NRBC 0.0 0  WBC    ABSOLUTE NRBC 0.00 0.00 - 0.01 K/uL    NEUTROPHILS 65 32 - 75 %    LYMPHOCYTES 25 12 - 49 %    MONOCYTES 8 5 - 13 %    EOSINOPHILS 1 0 - 7 %    BASOPHILS 1 0 - 1 %    IMMATURE GRANULOCYTES 0 0.0 - 0.5 %    ABS. NEUTROPHILS 5.8 1.8 - 8.0 K/UL    ABS. LYMPHOCYTES 2.2 0.8 - 3.5 K/UL    ABS. MONOCYTES 0.7 0.0 - 1.0 K/UL    ABS. EOSINOPHILS 0.1 0.0 - 0.4 K/UL    ABS. BASOPHILS 0.1 0.0 - 0.1 K/UL    ABS. IMM. GRANS. 0.0 0.00 - 0.04 K/UL    DF AUTOMATED     METABOLIC PANEL, COMPREHENSIVE    Collection Time: 02/15/21  6:15 AM   Result Value Ref Range    Sodium 144 136 - 145 mmol/L    Potassium 4.3 3.5 - 5.1 mmol/L    Chloride 106 97 - 108 mmol/L    CO2 28 21 - 32 mmol/L    Anion gap 10 5 - 15 mmol/L    Glucose 109 (H) 65 - 100 mg/dL    BUN 14 6 - 20 MG/DL    Creatinine 0.79 0.55 - 1.02 MG/DL    BUN/Creatinine ratio 18 12 - 20      GFR est AA >60 >60 ml/min/1.73m2    GFR est non-AA >60 >60 ml/min/1.73m2    Calcium 8.6 8.5 - 10.1 MG/DL    Bilirubin, total 0.5 0.2 - 1.0 MG/DL    ALT (SGPT) 27 12 - 78 U/L    AST (SGOT) 28 15 - 37 U/L    Alk. phosphatase 51 45 - 117 U/L    Protein, total 7.0 6.4 - 8.2 g/dL    Albumin 3.5 3.5 - 5.0 g/dL    Globulin 3.5 2.0 - 4.0 g/dL    A-G Ratio 1.0 (L) 1.1 - 2.2     TROPONIN I    Collection Time: 02/15/21  6:15 AM   Result Value Ref Range    Troponin-I, Qt. <0.05 <0.05 ng/mL         Imaging:   Cta Code Neuro Head And Neck W Cont    Result Date: 2/15/2021  1. No evidence of large vessel occlusion or hemodynamically significant carotid stenosis. 2.  Beaded appearance of the distal cervical internal carotid arteries and distal vertebral arteries indicative of fibromuscular dysplasia. Ct Code Neuro Head Wo Contrast    Result Date: 2/15/2021  No acute findings.     Assessment/Plan      TIA/ Bilateral upper extremity numbness: CT of the head with possible fibromuscular dysplasia, observe patient on a telemetry bed, neurovascular checks, MRI of the brain, neurology consult, neuro interventional consult, continue aspirin, statin, PT/OT consult, telemetry and supportive care, close monitoring and reassess as needed    Palpitations: Unclear etiology, telemetry monitoring, cycle troponins, echocardiogram, supportive care and close monitoring, if concerns or any signs of arrhythmias on the telemetry monitor may consider getting a cardiology consult, supportive care and close monitoring, reassess as needed    Hyperlipidemia: Home medications and continue monitor, reassess as needed    GI DVT prophylaxis: Patient will be on SCDs           Signed By: Ashleigh Villatoro MD     February 15, 2021

## 2021-02-16 ENCOUNTER — APPOINTMENT (OUTPATIENT)
Dept: NON INVASIVE DIAGNOSTICS | Age: 81
End: 2021-02-16
Attending: FAMILY MEDICINE
Payer: MEDICARE

## 2021-02-16 VITALS
TEMPERATURE: 98.1 F | SYSTOLIC BLOOD PRESSURE: 132 MMHG | RESPIRATION RATE: 18 BRPM | HEART RATE: 66 BPM | BODY MASS INDEX: 29.39 KG/M2 | OXYGEN SATURATION: 95 % | HEIGHT: 61 IN | WEIGHT: 155.65 LBS | DIASTOLIC BLOOD PRESSURE: 75 MMHG

## 2021-02-16 LAB
CHOLEST SERPL-MCNC: 154 MG/DL
CRP SERPL-MCNC: <0.29 MG/DL (ref 0–0.6)
ERYTHROCYTE [DISTWIDTH] IN BLOOD BY AUTOMATED COUNT: 13.2 % (ref 11.5–14.5)
ERYTHROCYTE [SEDIMENTATION RATE] IN BLOOD: 14 MM/HR (ref 0–30)
EST. AVERAGE GLUCOSE BLD GHB EST-MCNC: 126 MG/DL
HBA1C MFR BLD: 6 % (ref 4–5.6)
HCT VFR BLD AUTO: 35.4 % (ref 35–47)
HDLC SERPL-MCNC: 43 MG/DL
HDLC SERPL: 3.6 {RATIO} (ref 0–5)
HGB BLD-MCNC: 11.6 G/DL (ref 11.5–16)
LDLC SERPL CALC-MCNC: 87.6 MG/DL (ref 0–100)
LIPID PROFILE,FLP: NORMAL
MAGNESIUM SERPL-MCNC: 2.4 MG/DL (ref 1.6–2.4)
MCH RBC QN AUTO: 33.1 PG (ref 26–34)
MCHC RBC AUTO-ENTMCNC: 32.8 G/DL (ref 30–36.5)
MCV RBC AUTO: 101.1 FL (ref 80–99)
NRBC # BLD: 0 K/UL (ref 0–0.01)
NRBC BLD-RTO: 0 PER 100 WBC
PLATELET # BLD AUTO: 322 K/UL (ref 150–400)
PMV BLD AUTO: 9.1 FL (ref 8.9–12.9)
RBC # BLD AUTO: 3.5 M/UL (ref 3.8–5.2)
TRIGL SERPL-MCNC: 117 MG/DL (ref ?–150)
VLDLC SERPL CALC-MCNC: 23.4 MG/DL
WBC # BLD AUTO: 7 K/UL (ref 3.6–11)

## 2021-02-16 PROCEDURE — 74011250637 HC RX REV CODE- 250/637: Performed by: FAMILY MEDICINE

## 2021-02-16 PROCEDURE — C8929 TTE W OR WO FOL WCON,DOPPLER: HCPCS

## 2021-02-16 PROCEDURE — 74011250636 HC RX REV CODE- 250/636: Performed by: INTERNAL MEDICINE

## 2021-02-16 PROCEDURE — 74011250637 HC RX REV CODE- 250/637: Performed by: PSYCHIATRY & NEUROLOGY

## 2021-02-16 PROCEDURE — 85652 RBC SED RATE AUTOMATED: CPT

## 2021-02-16 PROCEDURE — 86140 C-REACTIVE PROTEIN: CPT

## 2021-02-16 PROCEDURE — 80061 LIPID PANEL: CPT

## 2021-02-16 PROCEDURE — 74011250636 HC RX REV CODE- 250/636: Performed by: FAMILY MEDICINE

## 2021-02-16 PROCEDURE — 99214 OFFICE O/P EST MOD 30 MIN: CPT | Performed by: PSYCHIATRY & NEUROLOGY

## 2021-02-16 PROCEDURE — 36415 COLL VENOUS BLD VENIPUNCTURE: CPT

## 2021-02-16 PROCEDURE — 85027 COMPLETE CBC AUTOMATED: CPT

## 2021-02-16 PROCEDURE — 83735 ASSAY OF MAGNESIUM: CPT

## 2021-02-16 PROCEDURE — 94760 N-INVAS EAR/PLS OXIMETRY 1: CPT

## 2021-02-16 PROCEDURE — 83036 HEMOGLOBIN GLYCOSYLATED A1C: CPT

## 2021-02-16 PROCEDURE — 99218 HC RM OBSERVATION: CPT

## 2021-02-16 PROCEDURE — 74011000250 HC RX REV CODE- 250: Performed by: INTERNAL MEDICINE

## 2021-02-16 RX ADMIN — ACETAMINOPHEN 650 MG: 325 TABLET ORAL at 03:42

## 2021-02-16 RX ADMIN — ASPIRIN 81 MG: 81 TABLET, CHEWABLE ORAL at 09:11

## 2021-02-16 RX ADMIN — FAMOTIDINE 20 MG: 20 TABLET, FILM COATED ORAL at 09:11

## 2021-02-16 RX ADMIN — THERA TABS 1 TABLET: TAB at 09:11

## 2021-02-16 RX ADMIN — SODIUM CHLORIDE 1.5 ML: 9 INJECTION INTRAMUSCULAR; INTRAVENOUS; SUBCUTANEOUS at 11:00

## 2021-02-16 RX ADMIN — SODIUM CHLORIDE 75 ML/HR: 9 INJECTION, SOLUTION INTRAVENOUS at 03:24

## 2021-02-16 NOTE — DISCHARGE SUMMARY
Discharge Summary       PATIENT ID: Rain Augustine  MRN: [id number]   YOB: 1940    DATE OF ADMISSION: 2/15/2021  6:04 AM    DATE OF DISCHARGE: 02/16/21  PRIMARY CARE PROVIDER: Helga Colon MD       DISCHARGING PROVIDER: Subhash Madrid MD    To contact this individual call [phone] and ask the  to page. If unavailable ask to be transferred the Adult Hospitalist Department. CONSULTATIONS: IP CONSULT TO HOSPITALIST  IP CONSULT TO NEUROLOGY  IP CONSULT TO NEUROINTERVENTIONAL SURGERY      PROCEDURES/SURGERIES: * No surgery found *    IMAGING  Mri Brain Wo Cont    Result Date: 2/16/2021  Normal MRI of the head. Mri Cerv Spine Wo Cont    Result Date: 2/16/2021  Multilevel spondylosis from C4 to C7 as above. Cta Code Neuro Head And Neck W Cont    Result Date: 2/15/2021  1. No evidence of large vessel occlusion or hemodynamically significant carotid stenosis. 2.  Beaded appearance of the distal cervical internal carotid arteries and distal vertebral arteries indicative of fibromuscular dysplasia. Ct Code Neuro Head Wo Contrast    Result Date: 2/15/2021  No acute findings. [id number] [facility] COURSE:     Rain Augustine is an [de-identified] y.o. female who presented with numbness in bilateral upper extremity who was admitted for suspected TIA. CT head with possible fibromuscular dysplasia. Normal MRI of the brain and MRI cervical with multilevel spondylosis from C4 to C7. Patient seen by a neurology and FITZ. Plan for outpatient follow up with neurosurgery.        DISCHARGE DIAGNOSES / PLAN:    # Bilateral upper extremity paresthesias suspect Cervical-spine in origin    Patient Active Problem List   Diagnosis Code    Palpitations R00.2               PENDING TEST RESULTS:   At the time of discharge the following test results are still pending: None    FOLLOW UP APPOINTMENTS:    Follow-up Information Follow up With Specialties Details Why Contact Info    Stacey White MD Family Medicine Schedule an appointment as soon as possible for a visit in 2 weeks Post hospital discharge follow up  [address] Rohit [place]  [address] [place] [address] [address]      Everett Jennings MD Neurosurgery Schedule an appointment as soon as possible for a visit in 2 weeks Neurosurgery/ spine specialist, please call and make appointment for evaluation  [address]  [phone]             ADDITIONAL CARE RECOMMENDATIONS:   · It is important that you take the medication exactly as they are prescribed. · Keep your medication in the bottles provided by the pharmacist and keep a list of the medication names, dosages, and times to be taken in your wallet. · Do not take other medications without consulting your doctor. · No drinking alcohol or driving car or operating machinery if you are on narcotic pain medications. Donot take sedating mediations if you are sleepy or confused. · Fall Precautions  · Keep Well Hydrated  · Report to your medical provider if you feel you have  developed allergies to medications  · Follow up with your PCP or Consultant for medication adjustments and refills  · Monitor for signs of fevers,chills,bleeding,chest pain and seek medical attention if you do so. - Please schedule with a spine specialist and have an evaluation.   - Take your medication like prescribed. DIET: Healthy heart diet     ACTIVITY: As tolerated     WOUND CARE: None     EQUIPMENT needed: None       DISCHARGE MEDICATIONS:  Current Discharge Medication List      CONTINUE these medications which have NOT CHANGED    Details   Omega-3 Fatty Acids (FISH OIL) 300 mg cap Take 360 mg by mouth. Taking one cap in the am and two in the evening      OTHER One garlic tab daily      aspirin delayed-release 81 mg tablet Take  by mouth daily. multivitamin (ONE A DAY) tablet Take 1 Tab by mouth daily. simvastatin (ZOCOR) 40 mg tablet Take  by mouth nightly. ACETAMINOPHEN/DIPHENHYDRAMINE (TYLENOL PM PO) Take 1 Tab by mouth nightly as needed.              All Micro Results     None          Recent Results (from the past 24 hour(s))   LIPID PANEL    Collection Time: 02/16/21  3:36 AM   Result Value Ref Range    LIPID PROFILE          Cholesterol, total 154 <200 MG/DL    Triglyceride 117 <150 MG/DL    HDL Cholesterol 43 MG/DL    LDL, calculated 87.6 0 - 100 MG/DL    VLDL, calculated 23.4 MG/DL    CHOL/HDL Ratio 3.6 0.0 - 5.0     HEMOGLOBIN A1C WITH EAG    Collection Time: 02/16/21  3:36 AM   Result Value Ref Range    Hemoglobin A1c 6.0 (H) 4.0 - 5.6 %    Est. average glucose 126 mg/dL   CBC W/O DIFF    Collection Time: 02/16/21  3:36 AM   Result Value Ref Range    WBC 7.0 3.6 - 11.0 K/uL    RBC 3.50 (L) 3.80 - 5.20 M/uL    HGB 11.6 11.5 - 16.0 g/dL    HCT 35.4 35.0 - 47.0 %    .1 (H) 80.0 - 99.0 FL    MCH 33.1 26.0 - 34.0 PG    MCHC 32.8 30.0 - 36.5 g/dL    RDW 13.2 11.5 - 14.5 %    PLATELET 899 712 - 651 K/uL    MPV 9.1 8.9 - 12.9 FL    NRBC 0.0 0  WBC    ABSOLUTE NRBC 0.00 0.00 - 0.01 K/uL   SED RATE (ESR)    Collection Time: 02/16/21  3:36 AM   Result Value Ref Range    Sed rate, automated 14 0 - 30 mm/hr   C REACTIVE PROTEIN, QT    Collection Time: 02/16/21  3:36 AM   Result Value Ref Range    C-Reactive protein <0.29 0.00 - 0.60 mg/dL   MAGNESIUM    Collection Time: 02/16/21  3:36 AM   Result Value Ref Range    Magnesium 2.4 1.6 - 2.4 mg/dL   ECHO ADULT COMPLETE    Collection Time: 02/16/21 10:06 AM   Result Value Ref Range    IVSd 0.80 0.6 - 0.9 cm    LVIDd 3.83 (A) 3.9 - 5.3 cm    LVIDs 2.44 cm    LVOT d 1.86 cm    LVPWd 0.91 (A) 0.6 - 0.9 cm    LVOT Peak Gradient 3.40 mmHg    LVOT Peak Velocity 92.25 cm/s    RVSP 29.31 mmHg    Left Atrium Major Axis 2.54 cm    Est. RA Pressure 3.00 mmHg    Aortic Valve Area by Continuity of Peak Velocity 1.79 cm2    AoV PG 7.88 mmHg    Aortic Valve Systolic Peak Velocity 270. 35 cm/s    MV A Harvey 65.74 cm/s    MV E Harvey 60.36 cm/s    TR Max Velocity 162.72 cm/s    Tapse 1.94 1.5 - 2.0 cm    Triscuspid Valve Regurgitation Peak Gradient 26.31 mmHg    TR Max Velocity 256.46 cm/s    MV E/A 0.92     LV Mass AL 95.3 67 - 162 g    LV Mass AL Index 56.1 43 - 95 g/m2    Left Atrium Minor Axis 1.50 cm    ROWAN/BSA Pk Harvey 1.1 cm2/m2           NOTIFY YOUR PHYSICIAN FOR ANY OF THE FOLLOWING:   Fever over 101 degrees for 24 hours. Chest pain, shortness of breath, fever, chills, nausea, vomiting, diarrhea, change in mentation, falling, weakness, bleeding. Severe pain or pain not relieved by medications. Or, any other signs or symptoms that you may have questions about. DISPOSITION:  x  Home With:   OT  PT  HH  RN       Long term SNF/Inpatient Rehab    Independent/assisted living    Hospice    Other:       PATIENT CONDITION AT DISCHARGE:     Functional status    Poor     Deconditioned    x Independent      Cognition     Lucid     Forgetful     Dementia      Catheters/lines (plus indication)    Kevin     PICC     PEG    x None      Code status   x  Full code     DNR      PHYSICAL EXAMINATION AT DISCHARGE:  Gen:  No distress, alert  HEENT:  Normal cephalic atraumatic, extra-occular movements are intact. Neck:  Supple, No JVD  Lungs:  Clear bilaterally, no wheeze, no rales, normal effort  Heart:  Regular Rate and Rhythm, normal S1 and S2, no edema  Abdomen:  Soft, non tender, normal bowel sounds, no guarding. Extremities:  Well perfused, no cyanosis or edema  Neurological:  Awake and alert, CN's are intact, normal strength throughout extremities  Skin:  No rashes or moles  Mental Status:  Normal thought process, does not appear anxious      CHRONIC MEDICAL DIAGNOSES:  Problem List as of 2/16/2021 Date Reviewed: 9/9/2016          Codes Class Noted - Resolved    Palpitations ICD-10-CM: R00.2  ICD-9-CM: 785.1  7/8/2015 - Present                Discussed with patient and family. Explained the importance of following up, Compliance with medications and recommendations on discharge,Side effect profile of medications were explained. Safety precautions at home and while taking pain medications also explained. All questions answered to the satisfaction of the patient/family. Discussed with consultant(s) who are agreeable to the discharge. Verbal and written instructions on discharge given. Explained about Discharge medications and side effect profile. Advised patient/family to followup with their pcp for medication refills and preauthorization of medications, Home health orders. checkups,screenign programs as appropriate for age. Thank you Arnoldo Bermeo MD for taking care of your patient, Please call with any questions. Greater than 35 minutes were spent with the patient on counseling and coordination of care    Signed:   Rajeev Bethea MD  2/16/2021  3:13 PM    Please note that this dictation was completed with [employer], the computer voice recognition software. Quite often unanticipated grammatical, syntax, homophones, and other interpretive errors are inadvertently transcribed by the computer software. Please disregard these errors. Please excuse any errors that have escaped final proofreading. Thank you.

## 2021-02-16 NOTE — ROUTINE PROCESS
Hospital follow-up PCP transitional care appointment has been scheduled with  for Feb 25 @ 330PM (earliest available). Pending patient discharge.   Katheryn Mathews, Care Management Specialist.

## 2021-02-16 NOTE — CONSULTS
Neurology Progress Note    Patient ID:  Shanell Ricks  [id number]  56 y.o.  1940    Chief Complaint: Arm numbness and tingling    Subjective:     [de-identified] woman who presented with sudden severe intense paresthesias of both upper extremities slightly worse on the right. Imaging also initially revealing fibromuscular dysplasia. Since yesterday no acute changes. She is walking around the unit. Symptoms are still slightly present in the arms but not as severe. Objective:       ROS:  Per HPI  All other 12 pt ROS negative    Meds:  Current Facility-Administered Medications   Medication Dose Route Frequency    therapeutic multivitamin (THERAGRAN) tablet 1 Tab  1 Tab Oral DAILY    atorvastatin (LIPITOR) tablet 40 mg  40 mg Oral QHS    acetaminophen (TYLENOL) tablet 650 mg  650 mg Oral Q4H PRN    Or    acetaminophen (TYLENOL) solution 650 mg  650 mg Per NG tube Q4H PRN    Or    acetaminophen (TYLENOL) suppository 650 mg  650 mg Rectal Q4H PRN    ondansetron (ZOFRAN) injection 4 mg  4 mg IntraVENous Q6H PRN    famotidine (PEPCID) tablet 20 mg  20 mg Oral Q12H    aspirin chewable tablet 81 mg  81 mg Oral DAILY       MRI Results (maximum last 3): Results from [place] encounter on 02/15/21   MRI CERV SPINE WO CONT    Narrative *PRELIMINARY REPORT*    MR cervical spine demonstrates moderate degenerative changes C4-C7 with mild to  moderate stenosis at these levels. The cervical cord is normal in signal  intensity. Preliminary report was provided by Dr. Prisca Patel, the on-call radiologist, at [phone]    Final report to follow. *END PRELIMINARY REPORT*      EXAM:  MRI CERV SPINE WO CONT    INDICATION:   Cervical spinal stenosis, bilateral lower extremity symptoms    COMPARISON: CT 8/9/2020    TECHNIQUE: MR imaging of the cervical spine was performed including sagittal T1,  T2, STIR;  axial GRE, T2, T1. Contrast was not administered. FINDINGS:  There is mild reversal of curvature centered at C4. No significant listhesis. .  Vertebral body heights are maintained. Marrow signal is normal.  The  craniocervical junction is intact. The course, caliber, and signal intensity of  the spinal cord are normal.      C2/3:   The spinal canal and neural foramina are widely patent. C3/4:   The spinal canal and neural foramina are widely patent. C4/5:  Moderate disc space narrowing. Mild broad-based disc osteophyte complex  that is asymmetric to the left causing mild spinal stenosis with greater  impingement on the left lateral recess. There is mild left neural foraminal  narrowing. C5/6:  Moderate disc space narrowing. Mild broad-based disc osteophyte complex  that is asymmetric to the right. There is mild spinal stenosis with greater  impingement on the right lateral recess. There is moderate right and mild left  neural foraminal narrowing. C6/7:  Severe disc space narrowing. Mild broad-based disc osteophyte complex  that is asymmetric to the left. There is mild to moderate spinal stenosis with  greater impingement on the left lateral recess. There is mild bilateral neural  foraminal narrowing. C7/T1:   The spinal canal and neural foramina are widely patent. Impression Multilevel spondylosis from C4 to C7 as above. MRI BRAIN WO CONT    Narrative *PRELIMINARY REPORT*  MR of the brain was placed no acute abnormality. Preliminary report was provided by Dr. Abiodun Mcallister, the on-call radiologist, at 10:00  PM  Final report to follow. *END PRELIMINARY REPORT*    *FINAL REPORT BELOW*  EXAM:  MRI BRAIN WO CONT  INDICATION:  TIA, acute onset right greater than left upper extremity numbness  and tingling. TECHNIQUE:  Sagittal T1, axial FLAIR, T2,T1 and gradient echo images as well as coronal T2  weighted images and axial diffusion weighted images of the head were obtained. COMPARISON:  CT head  FINDINGS:  The ventricular size and configuration are within normal limits.    No areas of abnormal signal in the cerebral hemispheres, brainstem or  cerebellum. No evidence of hemorrhage, mass, infarct or abnormal extra-axial fluid  collections. Flow voids are present in the vertebral basilar and carotid artery systems. The craniocervical junction is unremarkable. The structures at the cranial base including paranasal sinuses are unremarkable. Impression Normal MRI of the head. Results from [place] encounter on 10/10/11   MRI SHOULDER RIGHT WITHOUT CONTRAST    Narrative **Final Report**       ICD Codes / Adm. Diagnosis: 727.61   / COMPLETE RUPTURE OF ROTATOR CU    Examination:  MRI SHOULDER WO CON RT  - [id number] - Oct 10 2011  2:08PM  Accession No:  [id number]  Reason:  727.61      REPORT:  INDICATION: Right shoulder pain for one month. COMPARISON: None    TECHNIQUE: Axial, oblique coronal, and oblique sagittal noncontrast  MRI of   the right shoulder in the T1, T2, and proton density pulse sequences with   and without fat saturation performed on the open 0.7 Abbi magnet. FINDINGS: The bone marrow signal is within normal limits without evidence of   fracture or dislocation. A small glenohumeral joint effusion extends into   the subacromial/subdeltoid bursa. Ill-defined full thickness tear of the distal, anterior supraspinatus   tendon. Indistinct partial thickness tear of the distal subscapularis   tendon. Infraspinatus and teres minor tendons are intact. The biceps tendon   and anchor are intact. The glenoid labrum is grossly intact within the limitations of this   nonarthrographic study. The inferior glenohumeral ligament is within normal   limits. Moderate AC joint osteoarthritis. The anterior acromion process is type 2. The muscles are age-appropriate without focal atrophy or inflammation. IMPRESSION:   1. Indistinct full thickness tear of the supraspinatus tendon. 2. Indistinct partial thickness tear of the subscapularis tendon.   3. AC joint osteoarthritis.              Signing/Reading Doctor: Nida Quezada ([id number])    Approved: Nida Quezada ([id number])  10/10/2011                                      Lab Review   Recent Results (from the past 24 hour(s))   ASPIRIN TEST    Collection Time: 02/15/21  1:30 PM   Result Value Ref Range    Aspirin test 400 ARU   LIPID PANEL    Collection Time: 02/16/21  3:36 AM   Result Value Ref Range    LIPID PROFILE          Cholesterol, total 154 <200 MG/DL    Triglyceride 117 <150 MG/DL    HDL Cholesterol 43 MG/DL    LDL, calculated 87.6 0 - 100 MG/DL    VLDL, calculated 23.4 MG/DL    CHOL/HDL Ratio 3.6 0.0 - 5.0     HEMOGLOBIN A1C WITH EAG    Collection Time: 02/16/21  3:36 AM   Result Value Ref Range    Hemoglobin A1c 6.0 (H) 4.0 - 5.6 %    Est. average glucose 126 mg/dL   CBC W/O DIFF    Collection Time: 02/16/21  3:36 AM   Result Value Ref Range    WBC 7.0 3.6 - 11.0 K/uL    RBC 3.50 (L) 3.80 - 5.20 M/uL    HGB 11.6 11.5 - 16.0 g/dL    HCT 35.4 35.0 - 47.0 %    .1 (H) 80.0 - 99.0 FL    MCH 33.1 26.0 - 34.0 PG    MCHC 32.8 30.0 - 36.5 g/dL    RDW 13.2 11.5 - 14.5 %    PLATELET 033 592 - 902 K/uL    MPV 9.1 8.9 - 12.9 FL    NRBC 0.0 0  WBC    ABSOLUTE NRBC 0.00 0.00 - 0.01 K/uL   SED RATE (ESR)    Collection Time: 02/16/21  3:36 AM   Result Value Ref Range    Sed rate, automated 14 0 - 30 mm/hr   C REACTIVE PROTEIN, QT    Collection Time: 02/16/21  3:36 AM   Result Value Ref Range    C-Reactive protein <0.29 0.00 - 0.60 mg/dL   MAGNESIUM    Collection Time: 02/16/21  3:36 AM   Result Value Ref Range    Magnesium 2.4 1.6 - 2.4 mg/dL   ECHO ADULT COMPLETE    Collection Time: 02/16/21 10:06 AM   Result Value Ref Range    IVSd 0.80 0.6 - 0.9 cm    LVIDd 3.83 (A) 3.9 - 5.3 cm    LVIDs 2.44 cm    LVOT d 1.86 cm    LVPWd 0.91 (A) 0.6 - 0.9 cm    LVOT Peak Gradient 3.40 mmHg    LVOT Peak Velocity 92.25 cm/s    RVSP 29.31 mmHg    Left Atrium Major Axis 2.54 cm    Est. RA Pressure 3.00 mmHg    Aortic Valve Area by Continuity of Peak Velocity 1.79 cm2    AoV PG 7.88 mmHg    Aortic Valve Systolic Peak Velocity 676. 35 cm/s    MV A Harvey 65.74 cm/s    MV E Harvey 60.36 cm/s    TR Max Velocity 162.72 cm/s    Tapse 1.94 1.5 - 2.0 cm    Triscuspid Valve Regurgitation Peak Gradient 26.31 mmHg    TR Max Velocity 256.46 cm/s    MV E/A 0.92     LV Mass AL 95.3 67 - 162 g    LV Mass AL Index 56.1 43 - 95 g/m2    Left Atrium Minor Axis 1.50 cm    ROWAN/BSA Pk Harvey 1.1 cm2/m2       Additional comments:I reviewed the patient's new clinical lab test results. and I reviewed the patients new imaging test results. Patient Vitals for the past 8 hrs:   BP Temp Pulse Resp SpO2 Height Weight   02/16/21 1008 138/77     5' 1\" (1.549 m) 155 lb 10.3 oz (70.6 kg)   02/16/21 0908 138/77 98 °F (36.7 °C) 69 18 97 %         02/16 0701 - 02/16 1900  In: 240 [P.O.:240]  Out: -   No intake/output data recorded. Exam:  Visit Vitals  /77   Pulse 69   Temp 98 °F (36.7 °C)   Resp 18   Ht 5' 1\" (1.549 m)   Wt 155 lb 10.3 oz (70.6 kg)   SpO2 97%   Breastfeeding No   BMI 29.41 kg/m²     Pleasant elderly woman awake and alert following commands. Pupils are equal, EOMI  Face is grossly symmetric on smile tongue is midline speech is clear without aphasia  Strength is 5/5 throughout with good effort  Sensation grossly intact  Right arm and leg hyperreflexic 2/4 left side 1/4  No abnormal movements  Gait normal     PROBLEM LIST:     Patient Active Problem List   Diagnosis Code    Palpitations R00.2       Assessment/Plan:      80-year-old woman who presented with severe intense bilateral upper extremity paresthesias I suspect C-spine in origin. I reviewed the MRI which shows multilevel disease with some canal stenosis and neuroforaminal stenosis, no compression or cord signal change. MRI brain completely normal.  I think at this juncture I would follow clinically.   She should follow-up with primary care possibly a spine specialist.  She has no weakness which is reassuring. She should also stay on aspirin daily given the fibromuscular dysplasia and I discussed this with her. ARU is effective. Continue only low-dose daily. I discussed the imaging results with her personally we also reviewed the neuro anatomy. Questions were answered. Stroke education was also done. No further testing from neurology. Signing off, call if needed. During this evaluation, we also discussed stroke education to include signs and symptoms of stroke and TIA. This clinical note was dictated with an electronic dictation software that can make unintentional errors. If there are any questions, please contact me directly for clarification.       Signed:  [address] [facility], DO  2/16/2021  1:19 PM

## 2021-02-16 NOTE — PROGRESS NOTES
Occupational Therapy    Chart reviewed. Noted brain MRI negative. Patient received up ad vera in room, reporting acute deficits resolved and BUE sensation now intact. She expressed no other functional concerns but was receptive to [facility] education on signs/ symptoms of CVA. Patient does not require OT evaluation at this time. Will complete OT order.     Chalo Kee, OTR/L

## 2021-02-17 LAB
ECHO AV AREA PEAK VELOCITY: 1.79 CM2
ECHO AV AREA/BSA PEAK VELOCITY: 1.1 CM2/M2
ECHO AV PEAK GRADIENT: 7.88 MMHG
ECHO AV PEAK VELOCITY: 140.35 CM/S
ECHO EST RA PRESSURE: 3 MMHG
ECHO LA MAJOR AXIS: 2.54 CM
ECHO LA MINOR AXIS: 1.5 CM
ECHO LV INTERNAL DIMENSION DIASTOLIC: 3.83 CM (ref 3.9–5.3)
ECHO LV INTERNAL DIMENSION SYSTOLIC: 2.44 CM
ECHO LV IVSD: 0.8 CM (ref 0.6–0.9)
ECHO LV MASS 2D: 95.3 G (ref 67–162)
ECHO LV MASS INDEX 2D: 56.1 G/M2 (ref 43–95)
ECHO LV POSTERIOR WALL DIASTOLIC: 0.91 CM (ref 0.6–0.9)
ECHO LVOT DIAM: 1.86 CM
ECHO LVOT PEAK GRADIENT: 3.4 MMHG
ECHO LVOT PEAK VELOCITY: 92.25 CM/S
ECHO MV A VELOCITY: 65.74 CM/S
ECHO MV E VELOCITY: 60.36 CM/S
ECHO MV E/A RATIO: 0.92
ECHO RIGHT VENTRICULAR SYSTOLIC PRESSURE (RVSP): 29.31 MMHG
ECHO RV TAPSE: 1.94 CM (ref 1.5–2)
ECHO TV REGURGITANT MAX VELOCITY: 162.72 CM/S
ECHO TV REGURGITANT MAX VELOCITY: 256.46 CM/S
ECHO TV REGURGITANT PEAK GRADIENT: 26.31 MMHG

## 2021-02-24 NOTE — DISCHARGE INSTRUCTIONS
Discharge Instructions       PATIENT ID: Abhilash Brar  MRN: [id number]   YOB: 1940    DATE OF ADMISSION: 2/15/2021  6:04 AM    DATE OF DISCHARGE: 2/16/2021    PRIMARY CARE PROVIDER: Christal Perales MD     ATTENDING PHYSICIAN: Bao Dean MD  DISCHARGING PROVIDER: Randolph Hu MD    To contact this individual call [phone] and ask the  to page. If unavailable ask to be transferred the Adult Hospitalist Department. DISCHARGE DIAGNOSES   # Bilateral upper extremity paresthesias suspect Cervical-spine in origin    CONSULTATIONS: IP CONSULT TO HOSPITALIST  IP CONSULT TO NEUROLOGY  IP CONSULT TO NEUROINTERVENTIONAL SURGERY    PROCEDURES/SURGERIES: * No surgery found *    PENDING TEST RESULTS:   At the time of discharge the following test results are still pending: None     FOLLOW UP APPOINTMENTS:   Follow-up Information     Follow up With Specialties Details Why Contact Info    Christal Perales MD Family Medicine Schedule an appointment as soon as possible for a visit in 2 weeks Post hospital discharge follow up  [address] [place] [address]      Blake Landis MD Neurosurgery Schedule an appointment as soon as possible for a visit in 2 weeks Neurosurgery/ spine specialist, please call and make appointment for evaluation  [address]  [phone]             ADDITIONAL CARE RECOMMENDATIONS:  - Please schedule with a spine specialist and have an evaluation.   - Take your medication like prescribed. DIET: Healthy heart diet     ACTIVITY: As tolerated     WOUND CARE: None     EQUIPMENT needed: None       Radiology      DISCHARGE MEDICATIONS:   See Medication Reconciliation Form    · It is important that you take the medication exactly as they are prescribed.    · Keep your medication in the bottles provided by the pharmacist and keep a list of the medication names, dosages, and times to be taken in your wallet. · Do not take other medications without consulting your doctor. NOTIFY YOUR PHYSICIAN FOR ANY OF THE FOLLOWING:   Fever over 101 degrees for 24 hours. Chest pain, shortness of breath, fever, chills, nausea, vomiting, diarrhea, change in mentation, falling, weakness, bleeding. Severe pain or pain not relieved by medications. Or, any other signs or symptoms that you may have questions about.       DISPOSITION:  x  Home With:   OT  PT  HH  RN       SNF/Inpatient Rehab/LTAC    Independent/assisted living    Hospice    Other:     CDMP Checked:   Yes ***     PROBLEM LIST Updated:  Yes ***       Signed:   Magy Frazier MD  2/16/2021  3:10 PM

## 2023-05-11 RX ORDER — SIMVASTATIN 40 MG
TABLET ORAL
COMMUNITY

## 2023-05-11 RX ORDER — ASPIRIN 81 MG/1
TABLET ORAL DAILY
COMMUNITY